# Patient Record
Sex: MALE | Race: WHITE | NOT HISPANIC OR LATINO | URBAN - METROPOLITAN AREA
[De-identification: names, ages, dates, MRNs, and addresses within clinical notes are randomized per-mention and may not be internally consistent; named-entity substitution may affect disease eponyms.]

---

## 2018-08-07 ENCOUNTER — OFFICE (OUTPATIENT)
Dept: URBAN - METROPOLITAN AREA CLINIC 11 | Facility: CLINIC | Age: 28
End: 2018-08-07

## 2018-08-07 VITALS
HEART RATE: 93 BPM | SYSTOLIC BLOOD PRESSURE: 162 MMHG | HEIGHT: 71 IN | DIASTOLIC BLOOD PRESSURE: 101 MMHG | WEIGHT: 286 LBS

## 2018-08-07 DIAGNOSIS — K58.0 IRRITABLE BOWEL SYNDROME WITH DIARRHEA: ICD-10-CM

## 2018-08-07 PROCEDURE — 99213 OFFICE O/P EST LOW 20 MIN: CPT | Performed by: INTERNAL MEDICINE

## 2018-08-07 RX ORDER — RIFAXIMIN 550 MG/1
TABLET ORAL
Qty: 42 | Refills: 0 | Status: ACTIVE
Start: 2018-08-07

## 2018-08-07 NOTE — SERVICENOTES
We discussed his symptoms and at this point this would fit with a recurrent IBS with features of bacterial overgrowth.  Prior to scans or scopes (which we discussed), I would try the Xifaxan as previously planned.  We discused the use and risks of the meds.

## 2018-08-07 NOTE — SERVICEHPINOTES
He presents for f/u of his IBS.  He stated that about 3 weeks ago he had issues iwth diarrhea again.  He started taking Metamucil consistently and his stools improved.  He has had some acues in the right flank with the diarrhea then.  He stated that during the three week he had some urgency and smaller bits of stools wtih mucus.  This improved on the fiber.  He stated that the stools are "better but more often". He may have 3-4 stools a day two in the mornings and then once after lunch and after dinner. He did not see much change on the lactose free diet.

## 2025-03-24 ENCOUNTER — OFFICE (OUTPATIENT)
Dept: URBAN - METROPOLITAN AREA CLINIC 11 | Facility: CLINIC | Age: 35
End: 2025-03-24

## 2025-03-24 VITALS
WEIGHT: 272 LBS | HEART RATE: 92 BPM | OXYGEN SATURATION: 100 % | HEIGHT: 71 IN | SYSTOLIC BLOOD PRESSURE: 143 MMHG | DIASTOLIC BLOOD PRESSURE: 92 MMHG

## 2025-03-24 DIAGNOSIS — K60.2 ANAL FISSURE, UNSPECIFIED: ICD-10-CM

## 2025-03-24 DIAGNOSIS — K59.00 CONSTIPATION, UNSPECIFIED: ICD-10-CM

## 2025-03-24 PROCEDURE — 99204 OFFICE O/P NEW MOD 45 MIN: CPT
